# Patient Record
Sex: MALE | Race: WHITE | NOT HISPANIC OR LATINO | URBAN - NONMETROPOLITAN AREA
[De-identification: names, ages, dates, MRNs, and addresses within clinical notes are randomized per-mention and may not be internally consistent; named-entity substitution may affect disease eponyms.]

---

## 2019-10-01 ENCOUNTER — IMPORTED ENCOUNTER (OUTPATIENT)
Dept: URBAN - NONMETROPOLITAN AREA CLINIC 1 | Facility: CLINIC | Age: 34
End: 2019-10-01

## 2019-10-01 PROBLEM — T15.01XA: Noted: 2019-10-01

## 2019-10-01 PROCEDURE — 65222 REMOVE FOREIGN BODY FROM EYE: CPT

## 2019-10-01 PROCEDURE — 99203 OFFICE O/P NEW LOW 30 MIN: CPT

## 2019-10-01 NOTE — PATIENT DISCUSSION
*CORNEAL FB:. OD-  Discussed findings of exam in detail with the patient. -  Discussed the acute nature and treatment options with the patient. -  Discussed the importance of follow-up and the risk of serious infection  The patient was warned to return to the office immediately if they experience loss of vision or increased pain. -  The foreign body was removed at the slit lamp. START BESIVANCE BID OD X 5 DAYSMAY RTC FOR RUST RING REMOVAL IF DESIRES

## 2022-04-09 ASSESSMENT — VISUAL ACUITY
OS_CC: 20/20
OD_CC: 20/20